# Patient Record
Sex: FEMALE | Race: WHITE
[De-identification: names, ages, dates, MRNs, and addresses within clinical notes are randomized per-mention and may not be internally consistent; named-entity substitution may affect disease eponyms.]

---

## 2017-11-27 ENCOUNTER — HOSPITAL ENCOUNTER (INPATIENT)
Dept: HOSPITAL 10 - L-D | Age: 32
LOS: 3 days | Discharge: HOME | End: 2017-11-30
Attending: OBSTETRICS & GYNECOLOGY | Admitting: OBSTETRICS & GYNECOLOGY
Payer: MEDICAID

## 2017-11-27 VITALS — HEART RATE: 83 BPM | SYSTOLIC BLOOD PRESSURE: 123 MMHG | DIASTOLIC BLOOD PRESSURE: 76 MMHG

## 2017-11-27 VITALS
BODY MASS INDEX: 27.52 KG/M2 | WEIGHT: 155.32 LBS | HEIGHT: 63 IN | HEIGHT: 63 IN | WEIGHT: 155.32 LBS | BODY MASS INDEX: 27.52 KG/M2

## 2017-11-27 VITALS — DIASTOLIC BLOOD PRESSURE: 73 MMHG | RESPIRATION RATE: 18 BRPM | SYSTOLIC BLOOD PRESSURE: 117 MMHG | HEART RATE: 68 BPM

## 2017-11-27 VITALS — HEART RATE: 72 BPM | SYSTOLIC BLOOD PRESSURE: 114 MMHG | DIASTOLIC BLOOD PRESSURE: 73 MMHG | RESPIRATION RATE: 18 BRPM

## 2017-11-27 DIAGNOSIS — Z3A.36: ICD-10-CM

## 2017-11-27 DIAGNOSIS — O30.003: ICD-10-CM

## 2017-11-27 LAB
APTT BLD: 29.6 SEC (ref 25–35)
BASOPHILS # BLD AUTO: 0 10^3/UL (ref 0–0.1)
BASOPHILS NFR BLD: 0.4 % (ref 0–2)
EOSINOPHIL # BLD: 0.1 10^3/UL (ref 0–0.5)
EOSINOPHIL NFR BLD: 0.7 % (ref 0–7)
ERYTHROCYTE [DISTWIDTH] IN BLOOD BY AUTOMATED COUNT: 13.9 % (ref 11.5–14.5)
HCT VFR BLD CALC: 43.2 % (ref 37–47)
HGB BLD-MCNC: 14.4 G/DL (ref 12–16)
INR PPP: 0.92
LYMPHOCYTES # BLD AUTO: 1.5 10^3/UL (ref 0.8–2.9)
LYMPHOCYTES NFR BLD AUTO: 17.3 % (ref 15–51)
MCH RBC QN AUTO: 30.6 PG (ref 29–33)
MCHC RBC AUTO-ENTMCNC: 33.3 G/DL (ref 32–37)
MCV RBC AUTO: 91.9 FL (ref 82–101)
MONOCYTES # BLD: 0.6 10^3/UL (ref 0.3–0.9)
MONOCYTES NFR BLD: 6.9 % (ref 0–11)
NEUTROPHILS # BLD: 6.3 10^3/UL (ref 1.6–7.5)
NEUTROPHILS NFR BLD AUTO: 74 % (ref 39–77)
NRBC # BLD MANUAL: 0 10^3/UL (ref 0–0)
NRBC BLD AUTO-RTO: 0.2 /100WBC (ref 0–0)
PLATELET # BLD: 167 10^3/UL (ref 140–415)
PMV BLD AUTO: 12.6 FL (ref 7.4–10.4)
PROTHROMBIN TIME: 12.4 SEC (ref 12.2–14.2)
PT RATIO: 1
RBC # BLD AUTO: 4.7 10^6/UL (ref 4.2–5.4)
WBC # BLD AUTO: 8.5 10^3/UL (ref 4.8–10.8)

## 2017-11-27 PROCEDURE — 86901 BLOOD TYPING SEROLOGIC RH(D): CPT

## 2017-11-27 PROCEDURE — 85730 THROMBOPLASTIN TIME PARTIAL: CPT

## 2017-11-27 PROCEDURE — 3E033VJ INTRODUCTION OF OTHER HORMONE INTO PERIPHERAL VEIN, PERCUTANEOUS APPROACH: ICD-10-PCS

## 2017-11-27 PROCEDURE — 85610 PROTHROMBIN TIME: CPT

## 2017-11-27 PROCEDURE — 76815 OB US LIMITED FETUS(S): CPT

## 2017-11-27 PROCEDURE — 86900 BLOOD TYPING SEROLOGIC ABO: CPT

## 2017-11-27 PROCEDURE — 94760 N-INVAS EAR/PLS OXIMETRY 1: CPT

## 2017-11-27 PROCEDURE — 88307 TISSUE EXAM BY PATHOLOGIST: CPT

## 2017-11-27 PROCEDURE — 86850 RBC ANTIBODY SCREEN: CPT

## 2017-11-27 PROCEDURE — 90715 TDAP VACCINE 7 YRS/> IM: CPT

## 2017-11-27 PROCEDURE — 85025 COMPLETE CBC W/AUTO DIFF WBC: CPT

## 2017-11-27 PROCEDURE — 90686 IIV4 VACC NO PRSV 0.5 ML IM: CPT

## 2017-11-27 PROCEDURE — 87340 HEPATITIS B SURFACE AG IA: CPT

## 2017-11-27 PROCEDURE — 86592 SYPHILIS TEST NON-TREP QUAL: CPT

## 2017-11-27 RX ADMIN — PYRIDOXINE HYDROCHLORIDE SCH MLS/HR: 100 INJECTION, SOLUTION INTRAMUSCULAR; INTRAVENOUS at 16:34

## 2017-11-27 RX ADMIN — PYRIDOXINE HYDROCHLORIDE SCH MLS/HR: 100 INJECTION, SOLUTION INTRAMUSCULAR; INTRAVENOUS at 11:09

## 2017-11-27 RX ADMIN — IBUPROFEN SCH MG: 800 TABLET ORAL at 22:00

## 2017-11-27 RX ADMIN — PYRIDOXINE HYDROCHLORIDE SCH MLS/HR: 100 INJECTION, SOLUTION INTRAMUSCULAR; INTRAVENOUS at 21:36

## 2017-11-27 NOTE — RADRPT
PROCEDURE:   US OB. 

 

CLINICAL INDICATION:   Labor  

 

TECHNIQUE:   Multiple sonographic images of the pelvis were obtained.  The images were reviewed on a
 PACS workstation. 

 

COMPARISON:   No prior studies are available for comparison. 

 

FINDINGS:

 

There is a twin  viable intrauterine gestation.  

 

Baby A:

Cardiac activity is present with 142 beats per minute. 

There is a cephalic presentation. 

 

Measurements were made in order to determine fetal age. The results are as follows:

BPD =8.38 cm

HC =30.32 cm

AC =20.95 cm

FL =6. 82 cm.

Estimated gestational age of approximately 33-week-6-day

The estimated date of delivery is 01/09/2018.  

The EFW = 2260 g 9% .  

The placenta is anterior grade 1. There is no evidence for an abruption 

There is a normal amount of amniotic fluid

 

Baby B :

Cardiac activity is present with 169 beats per minute. 

There is a cephalic presentation. 

 

Measurements were made in order to determine fetal age. The results are as follows:

BPD =8.96 cm

HC =31.28 cm

AC =32.17 cm

FL =6.75 cm.

Estimated gestational age of approximately 35 weeks 4 days.  

The estimated date of delivery is 12/28/2017.  

The EFW = 2729 g 51% .  

The placenta is anterior grade 1. There is no evidence for an abruption .

There is a normal amount of amniotic fluid

 

IMPRESSION:

Twin live intrauterine pregnancy 

Baby A: gestation of approximately 33 weeks 6 days.  The estimated date of delivery is 01/09/2018

Baby B:  Gestation of approximately 35 weeks 4 days. Estimated date of delivery is 12/28/2017

 

Note there is discrepancy between the size of the twins. Recommend clinical correlation. .

_____________________________________________ 

.Luca Aguiar MD, MD           Date    Time 

Electronically viewed and signed by .Luca Aguiar MD, MD on 11/27/2017 16:03 

 

D:  11/27/2017 16:03  T:  11/27/2017 16:03

.W/

## 2017-11-27 NOTE — OPR
DATE OF OPERATION:  2017

 

 

PREOPERATIVE DIAGNOSIS:  Pregnancy at 35 weeks and 4 days with twins, spontaneous rupture of membran
es.

 

POSTOPERATIVE DIAGNOSIS:  Pregnancy at 35 weeks and 4 days with twins, spontaneous rupture of membra
karl.

 

OPERATION PERFORMED:  Primary low transverse  section.

 

SURGEON:  David Farfan MD

 

ASSISTANT:  Dr. Capellan. 

 

ANESTHESIA:  Spinal.

 

ANESTHESIOLOGIST:  Dr. Neves. 

 

PROCEDURE:  The patient was taken to the operating room and placed on the operating table.  After cerda
ccessful spinal anesthesia was given, the patient was placed in supine position.  The area was prepa
red and draped in the usual sterile fashion.  Spinal anesthesia was tested and was satisfactory.  Us
ing a scalpel, Pfannenstiel incision was made about 2 fingerbreadths above the symphysis pubis.  The
 incision was carried to the fascia.  The fascia was incised and extended bilaterally with Israel scis
sors.  Two Kochers were used to separate the fascia from the muscle.  The muscle was dissected down 
to peritoneum.  The peritoneum was bluntly entered.  Using a scalpel, a small transverse incision wa
s made in the lower segment of the uterus.  Upon entering the uterine cavity, bandage scissors was i
nserted to extend the incision bilaterally, curved up.  Twin A baby girl was delivered from cephalic
 presentation.  After suctioning clear of amniotic fluid, the baby was handed off to the  te
am in attendance.  Apgars were 9 and 9.  Twin B baby girl was delivered from cephalic presentation. 
 After suctioning clear of amniotic fluid, the baby was handed off to the  team in attendanc
e.  Apgars were 7 and 8.  The placenta was delivered without difficulty.  The uterus was closed with
 #1 Monocryl continuous locked.  After assuring hemostasis, both ovaries and tubes were inspected, a
ll looked normal.  The peritoneum was closed with 2-0 Vicryl continuous.  The fascia was closed with
 #1 Vicryl continuous in 2 segments.  The subcutaneous tissue was reapproximated with 2-0 plain.  Th
e skin was closed with staples.

 

ESTIMATED BLOOD LOSS:  600 mL.

 

COUNTS:  All counts were correct.

 

 

Dictated By: DAVID FARFAN MD

 

GD/NTS

DD:    2017 19:28:26

DT:    2017 21:51:07

Conf#: [id number]

DID#:  [id number]

CC: DAVID FARFAN MD; ELIZABETH CAPELLAN MD;*EndCC*

## 2017-11-27 NOTE — OPPN
Date/Time of Note


Date/Time of Note


DATE: 11/27/17 


TIME: 19:10





Operative Report


Planned Procedure


Procedure date


Nov 27, 2017


Procedure(s)


Primary c/s


Performed by


David Clemente MD


Assistant


Dr Capellan


Anesthesiologist:  HARLEY IBANEZ MD


Pre-procedure diagnosis


Pregnancy 35 weeks and 4 days, twins, SROM








Anesthesia Type:   spinal











Post-Procedure


Post-procedure diagnosis


Same


Findings


Live Baby [], Apgars [] and [], weight [], position [], [] presentation []cord.


Estimated Blood Loss:  600 - 700 mls (600 ml)


Specimen(s)


Placenta


Grafts/Implant(s)


none


Complication(s)


none











DAVID CLEMENTE MD Nov 27, 2017 19:14

## 2017-11-27 NOTE — HP
Date/Time of Note


Date/Time of Note


DATE: 17 


TIME: 18:03





OB - History


Hx of Present Pregnancy


Chief Complaint:  SROM


Estimated Due Date:  Dec 28, 2017


:  3


Para:  1


Spontaneous :  1


Therapeutic :  0


Prenatal Care:  Good Care


Ultrasounds:  Normal mid trimester US


Obstetrical Complications:  Other (twins)


Medical Complications:  None





Past Family/Social History


*


Past Medical, Surgical, Family and Obstetric Histories reviewed from prenatal 

chart.


GBS Status:  Negative





OB  Admission Exam


Vital Signs


Vital Signs





 Vital Signs








  Date Time  Temp Pulse Resp B/P Pulse Ox O2 Delivery O2 Flow Rate FiO2


 


17 10:42 99.1 83  123/76    











Physical Exam


HEENT:  WNL


Heart:  Rhythm Normal


Lungs:  Clear, Equal


Abdomen:  WNL


Extremities:  Normal


Reflexes:  Normal


Cervical Dilatation:  1cm


Effacement:  50%


Station:  -1


Membranes:  Ruptured


Amniotic Fluid:  Clear


Fetal Heart Rate:  130's


Accelerations:  Accelerations Present


Decelerations:  No Decelerations


Varibility:  Moderate


Last 72 hours Lab Results


 CBC & BMP


17 11:09











OB  Assessment/Plan


Reason for admission:  rupture of membranes, other (twins)


Plan:   Section











DAVID CLEMENTE MD 2017 18:07

## 2017-11-28 VITALS — SYSTOLIC BLOOD PRESSURE: 116 MMHG | DIASTOLIC BLOOD PRESSURE: 68 MMHG | HEART RATE: 67 BPM | RESPIRATION RATE: 21 BRPM

## 2017-11-28 VITALS — RESPIRATION RATE: 18 BRPM | DIASTOLIC BLOOD PRESSURE: 74 MMHG | HEART RATE: 63 BPM | SYSTOLIC BLOOD PRESSURE: 119 MMHG

## 2017-11-28 VITALS — SYSTOLIC BLOOD PRESSURE: 122 MMHG | RESPIRATION RATE: 19 BRPM | HEART RATE: 58 BPM | DIASTOLIC BLOOD PRESSURE: 70 MMHG

## 2017-11-28 VITALS — SYSTOLIC BLOOD PRESSURE: 128 MMHG | HEART RATE: 61 BPM | DIASTOLIC BLOOD PRESSURE: 75 MMHG | RESPIRATION RATE: 20 BRPM

## 2017-11-28 VITALS — SYSTOLIC BLOOD PRESSURE: 130 MMHG | HEART RATE: 57 BPM | RESPIRATION RATE: 18 BRPM | DIASTOLIC BLOOD PRESSURE: 86 MMHG

## 2017-11-28 LAB
BASOPHILS # BLD AUTO: 0 10^3/UL (ref 0–0.1)
BASOPHILS NFR BLD: 0.3 % (ref 0–2)
EOSINOPHIL # BLD: 0.1 10^3/UL (ref 0–0.5)
EOSINOPHIL NFR BLD: 0.4 % (ref 0–7)
ERYTHROCYTE [DISTWIDTH] IN BLOOD BY AUTOMATED COUNT: 13.7 % (ref 11.5–14.5)
HCT VFR BLD CALC: 37.8 % (ref 37–47)
HGB BLD-MCNC: 12.6 G/DL (ref 12–16)
LYMPHOCYTES # BLD AUTO: 1.6 10^3/UL (ref 0.8–2.9)
LYMPHOCYTES NFR BLD AUTO: 13.4 % (ref 15–51)
MCH RBC QN AUTO: 30.9 PG (ref 29–33)
MCHC RBC AUTO-ENTMCNC: 33.3 G/DL (ref 32–37)
MCV RBC AUTO: 92.6 FL (ref 82–101)
MONOCYTES # BLD: 0.8 10^3/UL (ref 0.3–0.9)
MONOCYTES NFR BLD: 6.6 % (ref 0–11)
NEUTROPHILS # BLD: 9.1 10^3/UL (ref 1.6–7.5)
NEUTROPHILS NFR BLD AUTO: 78.9 % (ref 39–77)
NRBC # BLD MANUAL: 0 10^3/UL (ref 0–0)
NRBC BLD AUTO-RTO: 0 /100WBC (ref 0–0)
PLATELET # BLD: 153 10^3/UL (ref 140–415)
PMV BLD AUTO: 12.8 FL (ref 7.4–10.4)
RBC # BLD AUTO: 4.08 10^6/UL (ref 4.2–5.4)
WBC # BLD AUTO: 11.6 10^3/UL (ref 4.8–10.8)

## 2017-11-28 RX ADMIN — IBUPROFEN SCH MG: 800 TABLET ORAL at 07:25

## 2017-11-28 RX ADMIN — Medication SCH MLS/HR: at 03:41

## 2017-11-28 RX ADMIN — PYRIDOXINE HYDROCHLORIDE SCH MLS/HR: 100 INJECTION, SOLUTION INTRAMUSCULAR; INTRAVENOUS at 08:10

## 2017-11-28 RX ADMIN — PYRIDOXINE HYDROCHLORIDE SCH MLS/HR: 100 INJECTION, SOLUTION INTRAMUSCULAR; INTRAVENOUS at 21:36

## 2017-11-28 RX ADMIN — KETOROLAC TROMETHAMINE PRN MG: 30 INJECTION, SOLUTION INTRAMUSCULAR at 03:41

## 2017-11-28 RX ADMIN — IBUPROFEN SCH MG: 800 TABLET ORAL at 21:32

## 2017-11-28 RX ADMIN — IBUPROFEN SCH MG: 800 TABLET ORAL at 14:00

## 2017-11-28 RX ADMIN — DOCUSATE SODIUM AND SENNOSIDES SCH TAB: 8.6; 5 TABLET, FILM COATED ORAL at 20:57

## 2017-11-28 RX ADMIN — KETOROLAC TROMETHAMINE PRN MG: 30 INJECTION, SOLUTION INTRAMUSCULAR at 14:11

## 2017-11-28 RX ADMIN — PYRIDOXINE HYDROCHLORIDE SCH MLS/HR: 100 INJECTION, SOLUTION INTRAMUSCULAR; INTRAVENOUS at 15:14

## 2017-11-28 RX ADMIN — Medication SCH MLS/HR: at 08:10

## 2017-11-28 RX ADMIN — DOCUSATE SODIUM AND SENNOSIDES SCH TAB: 8.6; 5 TABLET, FILM COATED ORAL at 09:06

## 2017-11-28 NOTE — NSTRPT
===================================

NST Information

===================================

Datetime Report Generated by CPN: 11/28/2017 14:22

   

   

===========================

Datetime: 11/27/2017 09:01

===========================

   

   

===================================

NST Information

===================================

   

 EGA:  35.4

 Test Number:  8

 Time on Monitor:  11/27/2017 09:53

 Time off Monitor:  11/27/2017 09:54

 NST Duration (Min):  1

 Reason for NST:  Multiple Gestation; Other

   Reason for NST Other:  Twins

 Test and Monitor Explained:  Monitor Explained; Test Explained; Verbalized Understanding

 Pulse:  75

 Resp:  17

 SBP:  130

 DBP:  81

   

===================================

Test Evaluation

===================================

   

 Patient States Fetal Movement:  Present

 Provider Notified:  Dr Farfan - [id number]





 Comments:  To u/s:

   Twin A-cephalic left, MVP 4.7cm

   Twin B-cephalic right, MVP 3.6cm

 Comments:  To u/s:

   Twin A-cephalic left, MVP 4.7cm, 

   Twin B-cephalic right, MVP 3.6cm, 

   Pt c/o SROM at 0950, FHT audible for both baby A and baby B. Pt to Labor and Delivery.

   

===================================

Electronically Signed By

===================================

   

 E-Signature:  Electronically signed by Nieves Tafti MD on 11/28/2017 at 14:21  with User ID: [id number]

   

===========================

Datetime: 11/22/2017 08:10

===========================

   

   

===================================

NST Information

===================================

   

 EGA:  34.6

 NST Duration (Min):  33

   

===========================

Datetime: 11/20/2017 08:10

===========================

   

   

===================================

NST Information

===================================

   

 EGA:  34.4

 NST Duration (Min):  43

   

===========================

Datetime: 11/16/2017 08:50

===========================

   

   

===================================

NST Information

===================================

   

 EGA:  34.0

 NST Duration (Min):  47

   

===========================

Datetime: 11/13/2017 08:49

===========================

   

   

===================================

NST Information

===================================

   

 EGA:  33.4

 NST Duration (Min):  58

   

===========================

Datetime: 11/09/2017 10:21

===========================

   

   

===================================

NST Information

===================================

   

 EGA:  33.0

 NST Duration (Min):  29

   

===========================

Datetime: 11/06/2017 08:57

===========================

   

   

===================================

NST Information

===================================

   

 EGA:  32.4

 NST Duration (Min):  35

   

===========================

Datetime: 11/02/2017 08:29

===========================

   

   

===================================

NST Information

===================================

   

 EGA:  32.0

   

===========================

Datetime: 11/02/2017 08:15

===========================

   

 NST Duration (Min):  37

## 2017-11-28 NOTE — QN
Documentation


Comment


No complaint


Afebrile VSS


Abdomen soft ND


POD #1


Stable


Ambulate


Advance diet.











DAVID CLEMENTE MD Nov 28, 2017 10:48

## 2017-11-29 VITALS — HEART RATE: 101 BPM | DIASTOLIC BLOOD PRESSURE: 71 MMHG | RESPIRATION RATE: 20 BRPM | SYSTOLIC BLOOD PRESSURE: 97 MMHG

## 2017-11-29 VITALS — HEART RATE: 61 BPM | RESPIRATION RATE: 18 BRPM | SYSTOLIC BLOOD PRESSURE: 121 MMHG | DIASTOLIC BLOOD PRESSURE: 76 MMHG

## 2017-11-29 VITALS — HEART RATE: 75 BPM | DIASTOLIC BLOOD PRESSURE: 72 MMHG | RESPIRATION RATE: 19 BRPM | SYSTOLIC BLOOD PRESSURE: 110 MMHG

## 2017-11-29 RX ADMIN — IBUPROFEN SCH MG: 800 TABLET ORAL at 13:32

## 2017-11-29 RX ADMIN — DOCUSATE SODIUM AND SENNOSIDES SCH TAB: 8.6; 5 TABLET, FILM COATED ORAL at 21:00

## 2017-11-29 RX ADMIN — DOCUSATE SODIUM AND SENNOSIDES SCH TAB: 8.6; 5 TABLET, FILM COATED ORAL at 08:39

## 2017-11-29 RX ADMIN — PYRIDOXINE HYDROCHLORIDE SCH MLS/HR: 100 INJECTION, SOLUTION INTRAMUSCULAR; INTRAVENOUS at 05:36

## 2017-11-29 RX ADMIN — IBUPROFEN SCH MG: 800 TABLET ORAL at 05:32

## 2017-11-29 RX ADMIN — IBUPROFEN SCH MG: 800 TABLET ORAL at 23:27

## 2017-11-29 NOTE — DS
Date/Time of Note


Date/Time of Note


DATE: 17 


TIME: 15:56





Obstetrical Discharge Record


Final Diagnosis


Final Diagnosis:   delivered


Other Final Diagnosis


twin pregnancy





 Section


 Section:  Primary


Primary Indication


twins





Complications


Multiple Gestation


 Rupture of Membranes:  Yes


Gestational Age at Rupture


35 weeks





Condition on Discharge


Physical Assessment


Voiding:  Yes


Bowel Movement:  Yes


Breast:  Soft, non-tender, Filling


Fundus:  Firm


Abdomen and Incision:


Incision intact


Calf Tenderness:  No


Patient Condition:  Stable











DAVID CLEMENTE MD 2017 15:58

## 2017-11-30 VITALS — HEART RATE: 80 BPM | RESPIRATION RATE: 18 BRPM | DIASTOLIC BLOOD PRESSURE: 73 MMHG | SYSTOLIC BLOOD PRESSURE: 108 MMHG

## 2017-11-30 VITALS — DIASTOLIC BLOOD PRESSURE: 67 MMHG | HEART RATE: 57 BPM | SYSTOLIC BLOOD PRESSURE: 134 MMHG | RESPIRATION RATE: 19 BRPM

## 2017-11-30 RX ADMIN — DOCUSATE SODIUM AND SENNOSIDES SCH TAB: 8.6; 5 TABLET, FILM COATED ORAL at 09:06

## 2017-11-30 RX ADMIN — IBUPROFEN SCH MG: 800 TABLET ORAL at 06:27

## 2018-10-15 ENCOUNTER — HOSPITAL ENCOUNTER (OUTPATIENT)
Age: 33
Discharge: HOME | End: 2018-10-15